# Patient Record
Sex: FEMALE | URBAN - METROPOLITAN AREA
[De-identification: names, ages, dates, MRNs, and addresses within clinical notes are randomized per-mention and may not be internally consistent; named-entity substitution may affect disease eponyms.]

---

## 2017-05-08 ENCOUNTER — EMERGENCY (EMERGENCY)
Facility: HOSPITAL | Age: 42
LOS: 1 days | Discharge: HOME | End: 2017-05-08
Attending: EMERGENCY MEDICINE | Admitting: EMERGENCY MEDICINE
Payer: MEDICAID

## 2017-05-08 VITALS
OXYGEN SATURATION: 100 % | HEART RATE: 100 BPM | DIASTOLIC BLOOD PRESSURE: 84 MMHG | RESPIRATION RATE: 16 BRPM | SYSTOLIC BLOOD PRESSURE: 116 MMHG | TEMPERATURE: 98 F

## 2017-05-08 VITALS
RESPIRATION RATE: 21 BRPM | HEART RATE: 86 BPM | DIASTOLIC BLOOD PRESSURE: 72 MMHG | OXYGEN SATURATION: 100 % | SYSTOLIC BLOOD PRESSURE: 100 MMHG

## 2017-05-08 LAB
ALBUMIN SERPL ELPH-MCNC: 4.3 G/DL — SIGNIFICANT CHANGE UP (ref 3.3–5)
ALP SERPL-CCNC: 75 U/L — SIGNIFICANT CHANGE UP (ref 40–120)
ALT FLD-CCNC: 20 U/L — SIGNIFICANT CHANGE UP (ref 4–33)
AST SERPL-CCNC: 39 U/L — HIGH (ref 4–32)
BASE EXCESS BLDV CALC-SCNC: -0.9 MMOL/L — SIGNIFICANT CHANGE UP
BASOPHILS # BLD AUTO: 0.02 K/UL — SIGNIFICANT CHANGE UP (ref 0–0.2)
BASOPHILS NFR BLD AUTO: 0.2 % — SIGNIFICANT CHANGE UP (ref 0–2)
BILIRUB SERPL-MCNC: 0.2 MG/DL — SIGNIFICANT CHANGE UP (ref 0.2–1.2)
BLD GP AB SCN SERPL QL: NEGATIVE — SIGNIFICANT CHANGE UP
BLOOD GAS VENOUS - CREATININE: SIGNIFICANT CHANGE UP MG/DL (ref 0.5–1.3)
BUN SERPL-MCNC: 12 MG/DL — SIGNIFICANT CHANGE UP (ref 7–23)
CALCIUM SERPL-MCNC: 9.7 MG/DL — SIGNIFICANT CHANGE UP (ref 8.4–10.5)
CHLORIDE BLDV-SCNC: 106 MMOL/L — SIGNIFICANT CHANGE UP (ref 96–108)
CHLORIDE SERPL-SCNC: 99 MMOL/L — SIGNIFICANT CHANGE UP (ref 98–107)
CO2 SERPL-SCNC: 21 MMOL/L — LOW (ref 22–31)
CREAT SERPL-MCNC: 0.85 MG/DL — SIGNIFICANT CHANGE UP (ref 0.5–1.3)
EOSINOPHIL # BLD AUTO: 0.13 K/UL — SIGNIFICANT CHANGE UP (ref 0–0.5)
EOSINOPHIL NFR BLD AUTO: 1.2 % — SIGNIFICANT CHANGE UP (ref 0–6)
GAS PNL BLDV: 134 MMOL/L — LOW (ref 136–146)
GLUCOSE BLDV-MCNC: 132 — HIGH (ref 70–99)
GLUCOSE SERPL-MCNC: 113 MG/DL — HIGH (ref 70–99)
HCO3 BLDV-SCNC: 24 MMOL/L — SIGNIFICANT CHANGE UP (ref 20–27)
HCT VFR BLD CALC: 39.9 % — SIGNIFICANT CHANGE UP (ref 34.5–45)
HCT VFR BLDV CALC: 39 % — SIGNIFICANT CHANGE UP (ref 34.5–45)
HGB BLD-MCNC: 12.5 G/DL — SIGNIFICANT CHANGE UP (ref 11.5–15.5)
HGB BLDV-MCNC: 12.7 G/DL — SIGNIFICANT CHANGE UP (ref 11.5–15.5)
IMM GRANULOCYTES NFR BLD AUTO: 0.5 % — SIGNIFICANT CHANGE UP (ref 0–1.5)
LACTATE BLDV-MCNC: 2.7 MMOL/L — HIGH (ref 0.5–2)
LYMPHOCYTES # BLD AUTO: 4.56 K/UL — HIGH (ref 1–3.3)
LYMPHOCYTES # BLD AUTO: 41 % — SIGNIFICANT CHANGE UP (ref 13–44)
MCHC RBC-ENTMCNC: 26.7 PG — LOW (ref 27–34)
MCHC RBC-ENTMCNC: 31.3 % — LOW (ref 32–36)
MCV RBC AUTO: 85.3 FL — SIGNIFICANT CHANGE UP (ref 80–100)
MONOCYTES # BLD AUTO: 0.86 K/UL — SIGNIFICANT CHANGE UP (ref 0–0.9)
MONOCYTES NFR BLD AUTO: 7.7 % — SIGNIFICANT CHANGE UP (ref 2–14)
NEUTROPHILS # BLD AUTO: 5.49 K/UL — SIGNIFICANT CHANGE UP (ref 1.8–7.4)
NEUTROPHILS NFR BLD AUTO: 49.4 % — SIGNIFICANT CHANGE UP (ref 43–77)
PCO2 BLDV: 29 MMHG — LOW (ref 41–51)
PH BLDV: 7.49 PH — HIGH (ref 7.32–7.43)
PLATELET # BLD AUTO: 162 K/UL — SIGNIFICANT CHANGE UP (ref 150–400)
PMV BLD: 10.8 FL — SIGNIFICANT CHANGE UP (ref 7–13)
PO2 BLDV: 91 MMHG — HIGH (ref 35–40)
POTASSIUM BLDV-SCNC: 3.8 MMOL/L — SIGNIFICANT CHANGE UP (ref 3.4–4.5)
POTASSIUM SERPL-MCNC: SIGNIFICANT CHANGE UP MMOL/L (ref 3.5–5.3)
POTASSIUM SERPL-SCNC: SIGNIFICANT CHANGE UP MMOL/L (ref 3.5–5.3)
PROT SERPL-MCNC: 8.4 G/DL — HIGH (ref 6–8.3)
RBC # BLD: 4.68 M/UL — SIGNIFICANT CHANGE UP (ref 3.8–5.2)
RBC # FLD: 14.5 % — SIGNIFICANT CHANGE UP (ref 10.3–14.5)
RH IG SCN BLD-IMP: POSITIVE — SIGNIFICANT CHANGE UP
SAO2 % BLDV: 97.8 % — HIGH (ref 60–85)
SODIUM SERPL-SCNC: 138 MMOL/L — SIGNIFICANT CHANGE UP (ref 135–145)
WBC # BLD: 11.12 K/UL — HIGH (ref 3.8–10.5)
WBC # FLD AUTO: 11.12 K/UL — HIGH (ref 3.8–10.5)

## 2017-05-08 PROCEDURE — 99284 EMERGENCY DEPT VISIT MOD MDM: CPT

## 2017-05-08 RX ORDER — TETANUS TOXOID, REDUCED DIPHTHERIA TOXOID AND ACELLULAR PERTUSSIS VACCINE, ADSORBED 5; 2.5; 8; 8; 2.5 [IU]/.5ML; [IU]/.5ML; UG/.5ML; UG/.5ML; UG/.5ML
0.5 SUSPENSION INTRAMUSCULAR ONCE
Qty: 0 | Refills: 0 | Status: COMPLETED | OUTPATIENT
Start: 2017-05-08 | End: 2017-05-08

## 2017-05-08 RX ORDER — CEFAZOLIN SODIUM 1 G
1000 VIAL (EA) INJECTION ONCE
Qty: 0 | Refills: 0 | Status: COMPLETED | OUTPATIENT
Start: 2017-05-08 | End: 2017-05-08

## 2017-05-08 RX ORDER — MORPHINE SULFATE 50 MG/1
4 CAPSULE, EXTENDED RELEASE ORAL ONCE
Qty: 0 | Refills: 0 | Status: DISCONTINUED | OUTPATIENT
Start: 2017-05-08 | End: 2017-05-08

## 2017-05-08 RX ADMIN — MORPHINE SULFATE 4 MILLIGRAM(S): 50 CAPSULE, EXTENDED RELEASE ORAL at 19:00

## 2017-05-08 RX ADMIN — Medication 100 MILLIGRAM(S): at 20:44

## 2017-05-08 RX ADMIN — TETANUS TOXOID, REDUCED DIPHTHERIA TOXOID AND ACELLULAR PERTUSSIS VACCINE, ADSORBED 0.5 MILLILITER(S): 5; 2.5; 8; 8; 2.5 SUSPENSION INTRAMUSCULAR at 20:44

## 2017-05-08 RX ADMIN — MORPHINE SULFATE 4 MILLIGRAM(S): 50 CAPSULE, EXTENDED RELEASE ORAL at 18:50

## 2017-05-08 NOTE — ED ADULT TRIAGE NOTE - CHIEF COMPLAINT QUOTE
pt with LAC to right wrist, pt unable to move fingers. hand cool and discolored. MD fortune called to triage. Charge RN called, pt to go directly back.

## 2017-05-08 NOTE — ED PROVIDER NOTE - MEDICAL DECISION MAKING DETAILS
Pt p/w active bleeding and weakness in the radial distribution of the R hand after laceration. R/o arterial bleeding, will obtain vascular consult, ?CTA, hand surgery c/s

## 2017-05-08 NOTE — ED PROVIDER NOTE - MUSCULOSKELETAL MINIMAL EXAM
R wrist, radial aspect, medial: ~2cm linear laceration with active bloody oozing, visible tendon rupture; unable to move thumb or index finger, radial pulse intact, intact capillary refill, +decreased sensation in the distal thumb and index

## 2017-05-08 NOTE — ED PROVIDER NOTE - OBJECTIVE STATEMENT
41yo F with no PMH p/w R wrist laceration. Pt states she was having an argument with a family member, swung her R arm, and hit a vase, breaking it. A piece of the vase cut the wrist. She had a lot of bleeding and has been unable to move her thumb/index finger since. Also c/o decreased sensation in the thumb and index. No other injuries noted.   Pt found to have active pumping of blood from wound in triage. Currently oozing.

## 2017-05-08 NOTE — ED ADULT NURSE NOTE - OBJECTIVE STATEMENT
pt received to 10, aox3.  pt c/o laceration and bleeding to right wrist.  bleeding controlled, MD at bedside.  pt reports lack of sensation to right fingers.  minimal voluntary movement noted.  SL placed, labs sent, MD at bedside, will monitor, awaiting further orders

## 2017-05-08 NOTE — ED PROVIDER NOTE - ATTENDING CONTRIBUTION TO CARE
Locurto  pt s/p deep laceration  as described  at triage  blood noted to be pumping  (suspected arteria)  here bleedoing controlled with local pressure  seen by hand    no visible arterial injury acc to consult but with presentation  CT ordered

## 2017-05-08 NOTE — ED PROVIDER NOTE - PROGRESS NOTE DETAILS
Seen by hand, dressed and splinted.  Per hand attg, pt to f/u at [facility], he called there to help arrange f/u.  CTA performed, no major vessel injury noted (possible injury to small side branches), good distal flow.  Will d/c, left message on follow up line to check official read. Seen by hand, dressed and splinted.  Per hand attg Dr. Pineda, pt to f/u at [facility] hand clinic, he called there to help arrange f/u.  CTA performed, no major vessel injury noted (possible injury to small side branches), good distal flow.  Will d/c, left message on follow up line to check official read.

## 2017-05-09 PROCEDURE — 73206 CT ANGIO UPR EXTRM W/O&W/DYE: CPT | Mod: 26,52,RT

## 2017-05-09 RX ORDER — CEPHALEXIN 500 MG
1 CAPSULE ORAL
Qty: 30 | Refills: 0 | OUTPATIENT
Start: 2017-05-09 | End: 2017-05-19

## 2017-05-09 NOTE — ED POST DISCHARGE NOTE - REASON FOR FOLLOW-UP
Radiology Follow-up/Other Telephone follow up  request : Dr Vega requesting we follow up with patient's official read for CT Angio and make sure no change in read from prelim. read. Patient has follow up with hand surgeon and was splinted. Presently no official read.

## 2020-03-26 ENCOUNTER — EMERGENCY (EMERGENCY)
Facility: HOSPITAL | Age: 45
LOS: 1 days | Discharge: HOME | End: 2020-03-26
Admitting: EMERGENCY MEDICINE
Payer: MEDICAID

## 2020-03-26 VITALS
TEMPERATURE: 102 F | RESPIRATION RATE: 24 BRPM | OXYGEN SATURATION: 100 % | DIASTOLIC BLOOD PRESSURE: 58 MMHG | HEART RATE: 107 BPM | SYSTOLIC BLOOD PRESSURE: 98 MMHG

## 2020-03-26 VITALS
RESPIRATION RATE: 20 BRPM | DIASTOLIC BLOOD PRESSURE: 88 MMHG | TEMPERATURE: 100 F | OXYGEN SATURATION: 96 % | HEART RATE: 118 BPM | SYSTOLIC BLOOD PRESSURE: 137 MMHG

## 2020-03-26 DIAGNOSIS — Z98.51 TUBAL LIGATION STATUS: Chronic | ICD-10-CM

## 2020-03-26 PROCEDURE — 99283 EMERGENCY DEPT VISIT LOW MDM: CPT

## 2020-03-26 PROCEDURE — 71045 X-RAY EXAM CHEST 1 VIEW: CPT | Mod: 26

## 2020-03-26 RX ORDER — ACETAMINOPHEN 500 MG
650 TABLET ORAL ONCE
Refills: 0 | Status: COMPLETED | OUTPATIENT
Start: 2020-03-26 | End: 2020-03-26

## 2020-03-26 RX ADMIN — Medication 650 MILLIGRAM(S): at 19:00

## 2020-03-26 RX ADMIN — Medication 650 MILLIGRAM(S): at 18:49

## 2020-03-26 NOTE — ED PROVIDER NOTE - CLINICAL SUMMARY MEDICAL DECISION MAKING FREE TEXT BOX
44 y/o F with pmx of "prediabetes" here for shortness of breath and fever. Will give tylenol now, cxr, covid testing based on possible exposure at work.  Will instruct to take antibiotics as per urgent care. 44 y/o F with pmx of "prediabetes" here for shortness of breath and fever. Will give tylenol now, cxr, covid testing based on possible exposure at work.  Will instruct to take antibiotics as per urgent care.   1931: CXR shows clear lungs. Pt well appearing. Will review strict return precautions and supportivecare.

## 2020-03-26 NOTE — ED PROVIDER NOTE - OBJECTIVE STATEMENT
46 y/o F with hx of prediabetes on metformin here for shortness of breath and fever.  Started yesterday, now with body aches, throat pain and cough.  Went to outside urgent care for dysuria and foul smelling urine x 2 weeks. Also reports diarrhea x 2 days.  Pt reports antibiotics were prescribed but pt didn't take it yet because today her fever and uri symptoms worsened. 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   with hx of sarcoidosis.   PSX tubal ligation  Allergies none

## 2020-03-26 NOTE — ED PROVIDER NOTE - NSFOLLOWUPINSTRUCTIONS_ED_ALL_ED_FT
We will call you at [phone] with COVID results.    REturn for worsening shortness of breath.    Symptoms may last up to 14 days.   Take antibiotics per [facility] for UTI as directed.     Recommendations for Patients Advised to Self-Isolate for Possible COVID-19 Exposure  We recommend the below precautionary steps from now until 14 days from when you returned from your travel or date of your last known possible contact:  ? Do not go to work, school, or public areas. Avoid using public transportation, ride-sharing, or taxis.  ? As much as possible, separate yourself from other people in your home. If you can, you should stay in a room and away from other people in your home. Also, you should use a separate bathroom, if available.  ? Wear the supplied mask whenever you are around other people.  ? If you have a non-urgent medical appointment, please reschedule for a later date. If the appointment is urgent, please call the healthcare provider and tell them that you are on selfisolation for possible COVID-19. This will help the healthcare provider’s office take steps to keep other people from getting infected or exposed. If you can reschedule routine appointments, do so.  ? Wash your hands often with soap and water for at least 15 to 20 seconds or clean your hands with an alcohol-based hand  that contains 60 to 95% alcohol, covering all surfaces of your hands and rubbing them together until they feel dry. Soap and water should be used preferentially if hands are visibly dirty.  ? Cover your mouth and nose with a tissue when you cough or sneeze. Throw used tissues in a lined trash can; immediately wash your hands.  ? Avoid touching your eyes, nose, and mouth with your hands.  ? Avoid sharing personal household items. You should not share dishes, drinking glasses, cups, eating utensils, towels, or bedding with other people or pets in your home. After using these items, they should be washed thoroughly with soap and water.  ? Clean and disinfect all “high-touch” surfaces every day. High touch surfaces include counters, tabletops, doorknobs, light switches, remote controls, bathroom fixtures, toilets, phones, keyboards, tablets, and bedside tables. Also, clean any surfaces that may have blood, stool, or body fluids on them

## 2020-03-26 NOTE — ED PROVIDER NOTE - PATIENT PORTAL LINK FT
You can access the FollowMyHealth Patient Portal offered by [facility] by registering at the following website: [email or web address]. By joining [facility]’s FollowMyHealth portal, you will also be able to view your health information using other applications (apps) compatible with our system.

## 2020-03-27 LAB — SARS-COV-2 RNA SPEC QL NAA+PROBE: DETECTED

## 2021-09-06 ENCOUNTER — EMERGENCY (EMERGENCY)
Facility: HOSPITAL | Age: 46
LOS: 1 days | Discharge: HOME | End: 2021-09-06
Attending: EMERGENCY MEDICINE | Admitting: EMERGENCY MEDICINE
Payer: MEDICAID

## 2021-09-06 VITALS
HEART RATE: 73 BPM | RESPIRATION RATE: 18 BRPM | DIASTOLIC BLOOD PRESSURE: 73 MMHG | OXYGEN SATURATION: 98 % | TEMPERATURE: 98 F | SYSTOLIC BLOOD PRESSURE: 91 MMHG

## 2021-09-06 VITALS
RESPIRATION RATE: 18 BRPM | TEMPERATURE: 98 F | HEART RATE: 69 BPM | SYSTOLIC BLOOD PRESSURE: 131 MMHG | HEIGHT: 65 IN | DIASTOLIC BLOOD PRESSURE: 87 MMHG | OXYGEN SATURATION: 99 %

## 2021-09-06 DIAGNOSIS — Z98.51 TUBAL LIGATION STATUS: Chronic | ICD-10-CM

## 2021-09-06 LAB
ALBUMIN SERPL ELPH-MCNC: 3.9 G/DL — SIGNIFICANT CHANGE UP (ref 3.3–5)
ALP SERPL-CCNC: 75 U/L — SIGNIFICANT CHANGE UP (ref 40–120)
ALT FLD-CCNC: 19 U/L — SIGNIFICANT CHANGE UP (ref 4–33)
ANION GAP SERPL CALC-SCNC: 15 MMOL/L — HIGH (ref 7–14)
APTT BLD: 27.2 SEC — SIGNIFICANT CHANGE UP (ref 27–36.3)
AST SERPL-CCNC: 22 U/L — SIGNIFICANT CHANGE UP (ref 4–32)
BASOPHILS # BLD AUTO: 0.04 K/UL — SIGNIFICANT CHANGE UP (ref 0–0.2)
BASOPHILS NFR BLD AUTO: 0.4 % — SIGNIFICANT CHANGE UP (ref 0–2)
BILIRUB SERPL-MCNC: <0.2 MG/DL — SIGNIFICANT CHANGE UP (ref 0.2–1.2)
BUN SERPL-MCNC: 11 MG/DL — SIGNIFICANT CHANGE UP (ref 7–23)
CALCIUM SERPL-MCNC: 9.4 MG/DL — SIGNIFICANT CHANGE UP (ref 8.4–10.5)
CHLORIDE SERPL-SCNC: 99 MMOL/L — SIGNIFICANT CHANGE UP (ref 98–107)
CO2 SERPL-SCNC: 22 MMOL/L — SIGNIFICANT CHANGE UP (ref 22–31)
CREAT SERPL-MCNC: 0.63 MG/DL — SIGNIFICANT CHANGE UP (ref 0.5–1.3)
EOSINOPHIL # BLD AUTO: 0.23 K/UL — SIGNIFICANT CHANGE UP (ref 0–0.5)
EOSINOPHIL NFR BLD AUTO: 2.2 % — SIGNIFICANT CHANGE UP (ref 0–6)
GLUCOSE SERPL-MCNC: 236 MG/DL — HIGH (ref 70–99)
HCG UR QL: NEGATIVE — SIGNIFICANT CHANGE UP
HCT VFR BLD CALC: 35.1 % — SIGNIFICANT CHANGE UP (ref 34.5–45)
HGB BLD-MCNC: 11.4 G/DL — LOW (ref 11.5–15.5)
IANC: 5.56 K/UL — SIGNIFICANT CHANGE UP (ref 1.5–8.5)
IMM GRANULOCYTES NFR BLD AUTO: 0.4 % — SIGNIFICANT CHANGE UP (ref 0–1.5)
INR BLD: 1.1 RATIO — SIGNIFICANT CHANGE UP (ref 0.88–1.16)
LYMPHOCYTES # BLD AUTO: 3.57 K/UL — HIGH (ref 1–3.3)
LYMPHOCYTES # BLD AUTO: 34.3 % — SIGNIFICANT CHANGE UP (ref 13–44)
MCHC RBC-ENTMCNC: 26.9 PG — LOW (ref 27–34)
MCHC RBC-ENTMCNC: 32.5 GM/DL — SIGNIFICANT CHANGE UP (ref 32–36)
MCV RBC AUTO: 82.8 FL — SIGNIFICANT CHANGE UP (ref 80–100)
MONOCYTES # BLD AUTO: 0.97 K/UL — HIGH (ref 0–0.9)
MONOCYTES NFR BLD AUTO: 9.3 % — SIGNIFICANT CHANGE UP (ref 2–14)
NEUTROPHILS # BLD AUTO: 5.56 K/UL — SIGNIFICANT CHANGE UP (ref 1.8–7.4)
NEUTROPHILS NFR BLD AUTO: 53.4 % — SIGNIFICANT CHANGE UP (ref 43–77)
NRBC # BLD: 0 /100 WBCS — SIGNIFICANT CHANGE UP
NRBC # FLD: 0 K/UL — SIGNIFICANT CHANGE UP
PLATELET # BLD AUTO: 286 K/UL — SIGNIFICANT CHANGE UP (ref 150–400)
POTASSIUM SERPL-MCNC: 4.2 MMOL/L — SIGNIFICANT CHANGE UP (ref 3.5–5.3)
POTASSIUM SERPL-SCNC: 4.2 MMOL/L — SIGNIFICANT CHANGE UP (ref 3.5–5.3)
PROT SERPL-MCNC: 7.2 G/DL — SIGNIFICANT CHANGE UP (ref 6–8.3)
PROTHROM AB SERPL-ACNC: 12.5 SEC — SIGNIFICANT CHANGE UP (ref 10.6–13.6)
RBC # BLD: 4.24 M/UL — SIGNIFICANT CHANGE UP (ref 3.8–5.2)
RBC # FLD: 14 % — SIGNIFICANT CHANGE UP (ref 10.3–14.5)
SODIUM SERPL-SCNC: 136 MMOL/L — SIGNIFICANT CHANGE UP (ref 135–145)
TROPONIN T, HIGH SENSITIVITY RESULT: <6 NG/L — SIGNIFICANT CHANGE UP
WBC # BLD: 10.41 K/UL — SIGNIFICANT CHANGE UP (ref 3.8–10.5)
WBC # FLD AUTO: 10.41 K/UL — SIGNIFICANT CHANGE UP (ref 3.8–10.5)

## 2021-09-06 PROCEDURE — 71046 X-RAY EXAM CHEST 2 VIEWS: CPT | Mod: 26

## 2021-09-06 PROCEDURE — 99284 EMERGENCY DEPT VISIT MOD MDM: CPT | Mod: 25

## 2021-09-06 PROCEDURE — 93010 ELECTROCARDIOGRAM REPORT: CPT

## 2021-09-06 RX ORDER — ASPIRIN/CALCIUM CARB/MAGNESIUM 324 MG
162 TABLET ORAL ONCE
Refills: 0 | Status: COMPLETED | OUTPATIENT
Start: 2021-09-06 | End: 2021-09-06

## 2021-09-06 RX ADMIN — Medication 162 MILLIGRAM(S): at 22:55

## 2021-09-06 NOTE — ED PROVIDER NOTE - PATIENT PORTAL LINK FT
You can access the FollowMyHealth Patient Portal offered by [facility] by registering at the following website: [email or web address]. By joining [facility]’s FollowMyHealth portal, you will also be able to view your health information using other applications (apps) compatible with our system.

## 2021-09-06 NOTE — ED PROVIDER NOTE - ATTENDING CONTRIBUTION TO CARE
Dr. Diggs:  I have personally performed a face to face bedside history and physical examination of this patient. I have discussed the history, examination, review of systems, assessment and plan of management with the resident. I have reviewed the electronic medical record and amended it to reflect my history, review of systems, physical exam, assessment and plan.    46F h/o DM on Metformin c/o CP radiating to left shoulder today while at rest.  Intermittent, squeezing pain.  Denies fever/chills, sob, n/v/d, palpitations.    Exam:  - nad  - rrr  - ctab   - abd soft ntnd    A/P  - CP, eval ACS  - cbc, cmp, trop, ekg, cxr

## 2021-09-06 NOTE — ED ADULT NURSE NOTE - OBJECTIVE STATEMENT
facilitator RN: pt received to room 17, c/o left sided chest discomfort beginning at rest. denies ever having these sxs before. PMH NIDDM. NSR on tele. assessment as documented.  primary RN Amber at bedside for line/labs.

## 2021-09-06 NOTE — ED PROVIDER NOTE - NSFOLLOWUPINSTRUCTIONS_ED_ALL_ED_FT
You were seen and evaluated today for chest pain. Our work-up today included blood work, EKG and chest x-ray, the results of which have been explained to you and provided to you separately. Please keep a copy of these results to present to doctor in future visits.     - Follow up with your primary care physician within 7-10 days  - Be sure to take all of your normal medications as prescribed  - Expect a call within the next 2-3 business days to schedule an appointment with cardiology  - If you do not receive a call, please call the number provided to you in this packet to schedule an appointment within one week from today    Please return to the Emergency Department should you experience any of the following:  - New or worsening chest pain  - Shortness of breath, Palpitations  - New weakness, fatigue, or fainting  - Fever, unexplained weight loss, night sweats  - Blood in stool or in urine

## 2021-09-06 NOTE — ED PROVIDER NOTE - OBJECTIVE STATEMENT
45yo F h.o prediabetes, obesity presenting to ED with first time chest pain, substernal, severe radiating through L chest. Pt reports symptoms began while at rest, and have been progressive since now severe. No modifying factors, has not taken any aspirin or analgesics at home. Pt denies any associated n/v, diaphoresis, palpitations or SOB. No abd pain, normal PO intake recently. No recent fever/chills, out of country travel, no recent lower extremity swelling or cramping. COVID-19 infection over 1 yr ago. No family history of blood clots or sudden cardiac death.   Home meds- Enalapril, Metformin

## 2021-09-06 NOTE — ED PROVIDER NOTE - PROGRESS NOTE DETAILS
Boston Rushing, PGY-2: Pt reexamined at bedside, resting comfortably, vitals stable. Trop <6, electrolytes wnl, cxr unremarkable. Explained to pt given low trop and ekg with no ischemic changes, very unlikely to be ACS. Given vitals, Chest xray unremarkable and oxygen is 100%, unlikely PE. Will advise to follow up closely with cardiology and strict return precautions given.

## 2021-09-06 NOTE — ED PROVIDER NOTE - NSICDXPASTSURGICALHX_GEN_ALL_CORE_FT
PAST SURGICAL HISTORY:  H/O tubal ligation     Termination of Pregnancy     Termination of Pregnancy

## 2022-08-08 ENCOUNTER — EMERGENCY (EMERGENCY)
Facility: HOSPITAL | Age: 47
LOS: 1 days | Discharge: LEFT BEFORE DISCHARGE | End: 2022-08-08
Admitting: EMERGENCY MEDICINE

## 2022-08-08 VITALS
OXYGEN SATURATION: 100 % | RESPIRATION RATE: 17 BRPM | DIASTOLIC BLOOD PRESSURE: 75 MMHG | SYSTOLIC BLOOD PRESSURE: 128 MMHG | TEMPERATURE: 99 F | HEIGHT: 65 IN | HEART RATE: 118 BPM

## 2022-08-08 DIAGNOSIS — Z98.51 TUBAL LIGATION STATUS: Chronic | ICD-10-CM

## 2022-08-08 PROCEDURE — L9991: CPT

## 2022-08-08 RX ORDER — IBUPROFEN 200 MG
400 TABLET ORAL ONCE
Refills: 0 | Status: DISCONTINUED | OUTPATIENT
Start: 2022-08-08 | End: 2022-08-12

## 2022-08-08 RX ORDER — ACETAMINOPHEN 500 MG
975 TABLET ORAL ONCE
Refills: 0 | Status: DISCONTINUED | OUTPATIENT
Start: 2022-08-08 | End: 2022-08-12

## 2022-08-08 NOTE — ED ADULT TRIAGE NOTE - CHIEF COMPLAINT QUOTE
pt c/o fever, chills, body aches, throat pain, headache and fever. denies use of OTC medication for symptom management.

## 2022-11-01 ENCOUNTER — APPOINTMENT (OUTPATIENT)
Dept: BARIATRICS | Facility: CLINIC | Age: 47
End: 2022-11-01

## 2022-11-01 ENCOUNTER — NON-APPOINTMENT (OUTPATIENT)
Age: 47
End: 2022-11-01

## 2022-11-01 VITALS
SYSTOLIC BLOOD PRESSURE: 110 MMHG | OXYGEN SATURATION: 98 % | HEART RATE: 76 BPM | TEMPERATURE: 96.5 F | DIASTOLIC BLOOD PRESSURE: 70 MMHG | HEIGHT: 64.5 IN | BODY MASS INDEX: 38.74 KG/M2 | WEIGHT: 229.72 LBS

## 2022-11-01 DIAGNOSIS — Z13.220 ENCOUNTER FOR SCREENING FOR LIPOID DISORDERS: ICD-10-CM

## 2022-11-01 DIAGNOSIS — E66.9 OBESITY, UNSPECIFIED: ICD-10-CM

## 2022-11-01 DIAGNOSIS — Z13.228 ENCOUNTER FOR SCREENING FOR OTHER SUSPECTED ENDOCRINE DISORDER: ICD-10-CM

## 2022-11-01 DIAGNOSIS — Z13.0 ENCOUNTER FOR SCREENING FOR DISEASES OF THE BLOOD AND BLOOD-FORMING ORGANS AND CERTAIN DISORDERS INVOLVING THE IMMUNE MECHANISM: ICD-10-CM

## 2022-11-01 DIAGNOSIS — Z13.29 ENCOUNTER FOR SCREENING FOR OTHER SUSPECTED ENDOCRINE DISORDER: ICD-10-CM

## 2022-11-01 DIAGNOSIS — Z92.29 PERSONAL HISTORY OF OTHER DRUG THERAPY: ICD-10-CM

## 2022-11-01 DIAGNOSIS — R63.5 ABNORMAL WEIGHT GAIN: ICD-10-CM

## 2022-11-01 DIAGNOSIS — Z13.0 ENCOUNTER FOR SCREENING FOR OTHER SUSPECTED ENDOCRINE DISORDER: ICD-10-CM

## 2022-11-01 DIAGNOSIS — Z13.21 ENCOUNTER FOR SCREENING FOR NUTRITIONAL DISORDER: ICD-10-CM

## 2022-11-01 PROCEDURE — 99205 OFFICE O/P NEW HI 60 MIN: CPT

## 2022-11-01 RX ORDER — ENALAPRIL MALEATE 2.5 MG/1
2.5 TABLET ORAL DAILY
Refills: 0 | Status: ACTIVE | COMMUNITY

## 2022-11-01 RX ORDER — METFORMIN HYDROCHLORIDE 1000 MG/1
1000 TABLET, COATED ORAL DAILY
Refills: 0 | Status: ACTIVE | COMMUNITY

## 2022-11-01 RX ORDER — ROSUVASTATIN CALCIUM 10 MG/1
10 TABLET, FILM COATED ORAL
Qty: 30 | Refills: 0 | Status: ACTIVE | COMMUNITY
Start: 2022-10-27

## 2022-11-01 RX ORDER — ENALAPRIL MALEATE 2.5 MG/1
2.5 TABLET ORAL
Qty: 30 | Refills: 0 | Status: ACTIVE | COMMUNITY
Start: 2022-10-05

## 2022-11-01 RX ORDER — METFORMIN HYDROCHLORIDE 1000 MG/1
1000 TABLET, COATED ORAL
Qty: 60 | Refills: 0 | Status: ACTIVE | COMMUNITY
Start: 2022-10-19

## 2022-11-01 RX ORDER — SODIUM FLUORIDE 1.1 G/100G
1.1 GEL, DENTIFRICE ORAL
Qty: 51 | Refills: 0 | Status: ACTIVE | COMMUNITY
Start: 2022-10-12

## 2022-11-01 RX ORDER — SEMAGLUTIDE 1.34 MG/ML
2 INJECTION, SOLUTION SUBCUTANEOUS
Refills: 0 | Status: ACTIVE | COMMUNITY

## 2022-11-01 RX ORDER — SODIUM FLUORIDE1.1%, POTASSIUM NITRATE 5% 5.8; 57.5 MG/ML; MG/ML
1.1-5 GEL, DENTIFRICE DENTAL
Qty: 100 | Refills: 0 | Status: ACTIVE | COMMUNITY
Start: 2022-05-18

## 2022-11-01 RX ORDER — AMOXICILLIN 500 MG/1
500 CAPSULE ORAL
Qty: 21 | Refills: 0 | Status: ACTIVE | COMMUNITY
Start: 2022-07-06

## 2022-11-01 RX ORDER — SEMAGLUTIDE 1.34 MG/ML
2 INJECTION, SOLUTION SUBCUTANEOUS
Qty: 2 | Refills: 0 | Status: ACTIVE | COMMUNITY
Start: 2022-08-29

## 2022-11-01 NOTE — CONSULT LETTER
[Dear  ___] : Dear  [unfilled], [Consult Letter:] : I had the pleasure of evaluating your patient, [unfilled]. [Please see my note below.] : Please see my note below. [Consult Closing:] : Thank you very much for allowing me to participate in the care of this patient.  If you have any questions, please do not hesitate to contact me. [Sincerely,] : Sincerely, [FreeTextEntry3] : Nell Vigil MD, FACS

## 2022-11-01 NOTE — ASSESSMENT
[FreeTextEntry1] : 47 year female with long-standing history of morbid obesity presents today to discuss options for weight loss surgery.  I had an extensive discussion with the patient reviewing the Laparoscopic Sleeve Gastrectomy and Laparoscopic Gastric Bypass. Diagrams were used. All questions were answered.  \par \par Complications were discussed including but not limited to: vitamin and protein deficiencies, pneumonia, dumping, urinary infection, wound infection, leaks/peritonitis possibly requiring intraabdominal drains or reoperation, bleeding, DVT, pulmonary embolus, severe reflux, sleeve obstruction, anastomotic ulcers, anastomotic strictures, abdominal wall hernias, internal hernias, revisions, death, inadequate weight loss. The importance of vitamins and protein supplementation was stressed, as was the importance of follow-up and exercise. \par \par Patient encouraged to make dietary and lifestyle changes in preparation for surgery.\par \par Patient with a long history of morbid obesity.  She is interested in the Laparoscopic Sleeve Gastrectomy. She was given written material to review.  Pre-operative evaluations were reviewed. She will need to lose weight prior to surgery and will be seen in the office  prior to surgery.  She was told to call with any questions.\par \par Sonogram performed by PMD revealed gallstones, patient reports no symptoms.\par

## 2022-11-01 NOTE — PHYSICAL EXAM
[Obese, well nourished, in no acute distress] : obese, well nourished, in no acute distress [Normal] : affect appropriate [de-identified] : obese, soft, nontender, no evidence of hernia

## 2022-11-01 NOTE — HISTORY OF PRESENT ILLNESS
[de-identified] : 47 year old woman with a long-standing history of morbid obesity, who has attempted numerous weight loss treatments without long term success. Patient is familiar with the Laparoscopic Adjustable Gastric Band, the Laparoscopic Sleeve Gastrectomy and the Laparoscopic Gastric Bypass. Patient presents today to discuss options for surgery, specifically the Laparoscopic Sleeve Gastrectomy.

## 2022-12-08 ENCOUNTER — APPOINTMENT (OUTPATIENT)
Dept: BARIATRICS/WEIGHT MGMT | Facility: CLINIC | Age: 47
End: 2022-12-08

## 2022-12-08 DIAGNOSIS — E11.9 TYPE 2 DIABETES MELLITUS W/OUT COMPLICATIONS: ICD-10-CM

## 2022-12-08 PROCEDURE — 97802 MEDICAL NUTRITION INDIV IN: CPT | Mod: 95

## 2022-12-09 VITALS — HEIGHT: 64.5 IN | WEIGHT: 228 LBS | BODY MASS INDEX: 38.45 KG/M2

## 2022-12-22 ENCOUNTER — APPOINTMENT (OUTPATIENT)
Dept: BARIATRICS/WEIGHT MGMT | Facility: CLINIC | Age: 47
End: 2022-12-22
Payer: MEDICAID

## 2022-12-22 VITALS — HEIGHT: 64.5 IN | WEIGHT: 222 LBS | BODY MASS INDEX: 37.44 KG/M2

## 2022-12-22 PROCEDURE — 90791 PSYCH DIAGNOSTIC EVALUATION: CPT | Mod: 95

## 2022-12-22 NOTE — CURRENT PSYCHIATRIC SYMPTOMS
[Depressed Mood] : no depressed mood [Decreased Concentration] : no decrease in concentrating ability [Euphoria] : no euphoria [Dec Need For Sleep] : no decreased need for sleep [Thought Disorder] : ~T a thought disorder was not noted [Excessive Worry] : no excessive worries [Panic] : no panic disorder [de-identified] : wakes in the middle of the night due to physical pain [FreeTextEntry1] : Denies all psyc tx hx.

## 2022-12-22 NOTE — PHYSICAL EXAM
[Normal] : good [AH] : no auditory hallucinations [VH] : no visual hallucinations [Suicidal Ideation] : no suicidal ideation [Homicidal Ideation] : no homicidal ideation [FreeTextEntry8] : "great"

## 2022-12-22 NOTE — SOCIAL HISTORY
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

## 2022-12-22 NOTE — REASON FOR VISIT
[Home] : at home, [unfilled] , at the time of the visit. [Other Location: e.g. Home (Enter Location, City,State)___] : at [unfilled] [Patient] : the patient [Initial Consult] : an initial consult for [Morbid Obesity (BMI<40)] : morbid obesity (bmi<40) [Referring By:  ___] : ~[address]~ was referred for psychological evaluation by Dr. MCDONNELL [Attempted Weight Loss] : attempted weight loss [Commitment to Modified Lifestyle] : commitment to a modified lifestyle pre and post surgery [Difficulties with Diet Compliance] : difficulties with diet compliance  [Expectations of Outcome] : expectations of outcome [Motivation for Selecting Surgery] : motivation for selecting surgery [Strength of Social Support System] : strength of social support system [Patient Understands Data May be Shared] : patient understands that the information discussed during the evaluation would be shared with referring provider and possibly with ~his/her~ insurance provider [de-identified] : for evaluation of psychological appropriateness for bariatric surgery [de-identified] : Confidentiality and its limitations were explained.

## 2022-12-22 NOTE — DISCUSSION/SUMMARY
[Educational materials provided] : Educational materials provided [Behavior plan developed] : Behavior plan developed [Strategies to improve adherence identified] : Strategies to improve adherence identified [Addtnl Health & Behavior Intervention: Group] : Additional Health and Behavior Intervention: Group [Develop and refine illness management to behavior plan] : Develop and refine illness management to behavior plan [Identify, practice refine strategies to promote adherence to regimen] : Identify, practice refine strategies to promote adherence to regimen [FreeTextEntry1] : Based on the information presented in this assessment, Ms. MCGRATH does not have any current psychological contraindications for bariatric surgery. [de-identified] : Psychological factors (skipping meals, poor dietary choices) affecting other medical conditions (obesity and associated medical sequelae)\par Obesity [FreeTextEntry3] : \par Behavioral strategies were discussed to increase her coping skills and assist her in making lifestyle modifications.  Provided psychoeducational materials on changing eating behaviors in preparation for surgery.  It was recommended that she attend the post-surgery group sessions for further education and support to assist her in making lifestyle changes.  Additionally, it was recommended that she utilize writer and RD as needed to assist in making pre-surgical and post-surgical dietary and behavioral changes. [FreeTextEntry5] : compliance with dietary and behavioral recommendations [FreeTextEntry6] : reduction of obesity related co-morbidities; reduced risk of further medical sequelae of obesity

## 2022-12-22 NOTE — HISTORY OF PRESENT ILLNESS
[Decrease Activity] : decrease activity [Poor Choices] : poor choices [de-identified] : Pt's motivation for surgery is to improve her health, especially T2DM.  Per pt, her highest weight was 250 lbs approx 1.5 yrs ago and her lowest weight was 135 lbs in her early 20s.  Her stated goal weight is 160-165 lbs and she expresses intent to make behavioral and dietary changes towards obtaining optimal results. [de-identified] : sleeve gastrectomy [de-identified] : \par 7 mths:  speaking with others who have had bariatric surgery; discussions with surgeon; attending nutrition education class; and reading educational materials provided by surgeon [de-identified] : none.  Pt denies ED hx and bxs, including binge eating. [de-identified] : A current typical day of eating is reported as follows:\par B:  coffee/tea and croissant or eggs\par L:  skips\par S:  might have some fruit\par D at 5 or 6pm:  chicken/fish w/salad or avocado and a small portion of rice\par S:  sometimes has something sweet, like cake\par Drinks water and 1 cup of coffee or tea. [de-identified] : joining the gym, increased walking, liquid diet, cabbage soup diet, meal replacement shakes, low carb diet and OTC diet pills. Currently prescribed Ozempic.  Despite multiple attempts at diet and exercise modifications, patient reports inability to maintain weight loss.

## 2022-12-28 ENCOUNTER — APPOINTMENT (OUTPATIENT)
Dept: PULMONOLOGY | Facility: CLINIC | Age: 47
End: 2022-12-28

## 2022-12-28 VITALS
HEART RATE: 98 BPM | TEMPERATURE: 97.3 F | OXYGEN SATURATION: 98 % | HEIGHT: 64 IN | SYSTOLIC BLOOD PRESSURE: 99 MMHG | BODY MASS INDEX: 38.76 KG/M2 | DIASTOLIC BLOOD PRESSURE: 70 MMHG | WEIGHT: 227 LBS

## 2022-12-28 DIAGNOSIS — E66.01 MORBID (SEVERE) OBESITY DUE TO EXCESS CALORIES: ICD-10-CM

## 2022-12-28 DIAGNOSIS — Z01.811 ENCOUNTER FOR PREPROCEDURAL RESPIRATORY EXAMINATION: ICD-10-CM

## 2022-12-28 PROCEDURE — 99204 OFFICE O/P NEW MOD 45 MIN: CPT | Mod: 25

## 2022-12-28 PROCEDURE — 94726 PLETHYSMOGRAPHY LUNG VOLUMES: CPT

## 2022-12-28 PROCEDURE — 94729 DIFFUSING CAPACITY: CPT

## 2022-12-28 PROCEDURE — ZZZZZ: CPT

## 2022-12-28 PROCEDURE — 94010 BREATHING CAPACITY TEST: CPT

## 2022-12-28 NOTE — HISTORY OF PRESENT ILLNESS
[Never] : never [TextBox_4] : 47 y.o female PMH Obesity, DM, HTN, HLD here for pulmonary clearance for bariatric surgery. She denies hx of pulmonary problems, SOB, cough, wheeze. Feels she is able to keep up with her friends physically. C/o heavy snoring.  Denies sleepiness.\par \par PFT: FEV1/FVC 89 FVC 71 TLC 75 DLCO 74

## 2022-12-28 NOTE — ASSESSMENT
[FreeTextEntry1] : preop pulm clearance\par Patient is cleared from a pulmonary perspective. We recommend further workup to investigate ?CRISTÓBAL and have ordered a diagnostic PSG for evaluation.\par \par For patient’s upcoming bariatric surgery, the anesthesiologist should be made aware of her potential diagnosis of obstructive sleep apnea, and take appropriate perioperative sleep apnea-related precautions. These include extubation only when fully awake and alert and in a non-supine position with close post-operative respiratory monitoring until effects of medications with respiratory depressant properties are resolved. The patient should use CPAP perioperatively as clinically indicated. With these precautions the patient should tolerate the proposed surgery from a sleep apnea perspective. \par \par \par Nelly MALHOTRA NP, am scribing for and in the presence of Dr. Mike Mcnair, the following sections HISTORY OF PRESENT ILLNESS, PAST MEDICAL/FAMILY/SOCIAL HISTORY; REVIEW OF SYSTEMS; VITAL SIGNS; PHYSICAL EXAM; DISPOSITION.\par

## 2023-01-03 ENCOUNTER — APPOINTMENT (OUTPATIENT)
Dept: GASTROENTEROLOGY | Facility: CLINIC | Age: 48
End: 2023-01-03
Payer: MEDICAID

## 2023-01-03 ENCOUNTER — NON-APPOINTMENT (OUTPATIENT)
Age: 48
End: 2023-01-03

## 2023-01-03 VITALS
OXYGEN SATURATION: 97 % | HEART RATE: 74 BPM | WEIGHT: 226 LBS | DIASTOLIC BLOOD PRESSURE: 70 MMHG | TEMPERATURE: 98.3 F | BODY MASS INDEX: 38.58 KG/M2 | SYSTOLIC BLOOD PRESSURE: 120 MMHG | HEIGHT: 64 IN

## 2023-01-03 DIAGNOSIS — Z12.11 ENCOUNTER FOR SCREENING FOR MALIGNANT NEOPLASM OF COLON: ICD-10-CM

## 2023-01-03 DIAGNOSIS — Z01.818 ENCOUNTER FOR OTHER PREPROCEDURAL EXAMINATION: ICD-10-CM

## 2023-01-03 PROCEDURE — 99204 OFFICE O/P NEW MOD 45 MIN: CPT

## 2023-01-03 RX ORDER — SODIUM SULFATE, POTASSIUM SULFATE AND MAGNESIUM SULFATE 1.6; 3.13; 17.5 G/177ML; G/177ML; G/177ML
17.5-3.13-1.6 SOLUTION ORAL
Qty: 1 | Refills: 0 | Status: ACTIVE | COMMUNITY
Start: 2023-01-03 | End: 1900-01-01

## 2023-01-03 NOTE — CONSULT LETTER
[Dear  ___] : Dear  [unfilled], [Please see my note below.] : Please see my note below. [Consult Closing:] : Thank you very much for allowing me to participate in the care of this patient.  If you have any questions, please do not hesitate to contact me. [Sincerely,] : Sincerely, [FreeTextEntry2] : Dr. Ashley Hartman [FreeTextEntry3] : Jeremy Flynn MD\par [facility]\par  of Medicine, [facility] [employer] at hospitals/[facility]\par Tel: [phone]\par Fax: [phone]\par

## 2023-01-03 NOTE — REASON FOR VISIT
[Initial Evaluation] : an initial evaluation [FreeTextEntry1] : Pre-bariatric surgery evaluation, colon cancer screening

## 2023-01-03 NOTE — PHYSICAL EXAM
[Alert] : alert [Normal Voice/Communication] : normal voice/communication [Sclera] : the sclera and conjunctiva were normal [Hearing Threshold Finger Rub Not [place]] : hearing was normal [Normal Appearance] : the appearance of the neck was normal [No Respiratory Distress] : no respiratory distress [No Acc Muscle Use] : no accessory muscle use [Respiration, Rhythm And Depth] : normal respiratory rhythm and effort [Heart Rate And Rhythm] : heart rate was normal and rhythm regular [Normal S1, S2] : normal S1 and S2 [Murmurs] : no murmurs [Abdomen Tenderness] : non-tender [No Masses] : no abdominal mass palpated [Abdomen Soft] : soft [No CVA Tenderness] : no CVA  tenderness [Abnormal Walk] : normal gait [] : no rash [No Focal Deficits] : no focal deficits [Oriented To Time, Place, And Person] : oriented to person, place, and time [Normal Affect] : the affect was normal

## 2023-01-03 NOTE — ASSESSMENT
[FreeTextEntry1] : Impression:\par 1. Morbid obesity, pending bariatric surgery/sleeve gastrectomy\par 2. Colon cancer screening - average risk, due for screening now\par \par Plan:\par -Plan for EGD for pre-bariatric surgery evaluation, and colonoscopy for colon cancer screening\par -The alternatives to colonoscopy, including stool testing and virtual colonography for cancer screening was explained. The risks/benefits and limitations, including that of missed lesions associated with the alternatives and that colonoscopy is recommended if there is a positive result was explained.\par -Risks and benefits of upper endoscopy and colonoscopy including but not limited to bleeding, infection, missed lesions, perforation, injury to internal organs, anesthesia/drug side effects were discussed with patient. All questions answered. Patient is agreeable to the procedures.\par -Discussed diet and bowel prep instructions prior to procedure; asked patient to diabetic medications the morning of procedure while NPO\par -Asked patient to go for dental evaluation before her procedures, as her loose teeth will have to be extracted before procedures due to anesthesia risk, and that procedures might be cancelled if loose teeth are still present

## 2023-01-03 NOTE — HISTORY OF PRESENT ILLNESS
[FreeTextEntry1] : This is a 47 year old female with morbid obesity, who presents for evaluation for pre-bariatric surgery evaluation.\par \par The patient is planning to have laparoscopic sleeve gastrectomy to treat obesity, and does not yet have date for the surgery yet. She reports feeling well. She denies any trouble eating, swallowing, dysphagia, reflux. She reports normal bowel habits, no melena/hematochezia/diarrhea. She has not had endoscopic evaluation or colon cancer screening before. There is no colon cancer in her family. She has 2 loose front tooth and is going to the dentist for extraction by the end of the month.\par \par 9/6/21: WBC 10, Hgb 11.4, Hct 35.1, \par Na 136, K 4.2, Cl 99, bicarb 22, BUN 11, Cr 0.63, glucose 236, Ca 9.4, T protein 7.2, albumin 3.9, T bili <0.2, ALP 75, AST 22, ALT 19

## 2023-01-12 ENCOUNTER — APPOINTMENT (OUTPATIENT)
Dept: BARIATRICS | Facility: CLINIC | Age: 48
End: 2023-01-12

## 2023-01-19 ENCOUNTER — APPOINTMENT (OUTPATIENT)
Dept: SLEEP CENTER | Facility: CLINIC | Age: 48
End: 2023-01-19

## 2023-02-03 ENCOUNTER — APPOINTMENT (OUTPATIENT)
Dept: GASTROENTEROLOGY | Facility: HOSPITAL | Age: 48
End: 2023-02-03

## 2024-04-04 NOTE — ED ADULT TRIAGE NOTE - LOCATION:
Rounded on pt, pt updated on the POC. Comfort, positioning, and restroom needs addressed. The pt is resting queitly in bed, eyes open spontaneously, chest rising and falling, respirations are even and unlabored, NADN. Bed in lowest and locked position, SR ^ X 2, call light in reach. Sitter maintaining visual contact. Pt encouraged to call nurses station for any needs. Pt verbalizes understanding.    Left arm;